# Patient Record
Sex: FEMALE
[De-identification: names, ages, dates, MRNs, and addresses within clinical notes are randomized per-mention and may not be internally consistent; named-entity substitution may affect disease eponyms.]

---

## 2024-03-25 ENCOUNTER — APPOINTMENT (OUTPATIENT)
Dept: UROLOGY | Facility: CLINIC | Age: 49
End: 2024-03-25

## 2024-05-01 ENCOUNTER — APPOINTMENT (OUTPATIENT)
Dept: UROLOGY | Facility: CLINIC | Age: 49
End: 2024-05-01
Payer: COMMERCIAL

## 2024-05-01 VITALS
HEIGHT: 67 IN | OXYGEN SATURATION: 100 % | DIASTOLIC BLOOD PRESSURE: 78 MMHG | BODY MASS INDEX: 24.48 KG/M2 | SYSTOLIC BLOOD PRESSURE: 110 MMHG | WEIGHT: 156 LBS | RESPIRATION RATE: 16 BRPM | HEART RATE: 60 BPM

## 2024-05-01 DIAGNOSIS — Z80.3 FAMILY HISTORY OF MALIGNANT NEOPLASM OF BREAST: ICD-10-CM

## 2024-05-01 DIAGNOSIS — N39.46 MIXED INCONTINENCE: ICD-10-CM

## 2024-05-01 DIAGNOSIS — N39.498 OTHER SPECIFIED URINARY INCONTINENCE: ICD-10-CM

## 2024-05-01 DIAGNOSIS — Z80.1 FAMILY HISTORY OF MALIGNANT NEOPLASM OF TRACHEA, BRONCHUS AND LUNG: ICD-10-CM

## 2024-05-01 PROCEDURE — 51798 US URINE CAPACITY MEASURE: CPT

## 2024-05-01 PROCEDURE — 99204 OFFICE O/P NEW MOD 45 MIN: CPT

## 2024-05-01 PROCEDURE — 51741 ELECTRO-UROFLOWMETRY FIRST: CPT

## 2024-05-01 NOTE — REVIEW OF SYSTEMS
[Recent Weight Loss (___ Lbs)] : recent [unfilled] ~Ulb weight loss [see HPI] : see HPI [Date of last menstrual period ____] : date of last menstrual period: [unfilled] [Leakage of urine with urgency] : leakage of urine with urgency [Leakage of urine with straining, coughing, laughing] : leakage of urine with straining, coughing, laughing [Unaware of when urine is leaking] : unaware of when urine is leaking [Negative] : Heme/Lymph

## 2024-05-05 PROBLEM — N39.498 FREQUENT URINARY INCONTINENCE: Status: ACTIVE | Noted: 2024-05-01

## 2024-05-05 PROBLEM — N35.919 URETHRAL MEATAL STENOSIS: Status: ACTIVE | Noted: 2024-05-05

## 2024-05-05 NOTE — ASSESSMENT
[FreeTextEntry1] : 47 y/o female with mixed JUSTINO. Large bladder capacity, weak pelvic floor and mixed incontinence. Kegel exercises demonstrated and advised daily hold 3 seconds and relax for 5 seconds. 10 daily. kegel exercises. 65 % success in 3 months with compliance  Avoid bladder irritants. voiding diary  Uroflow: prolonged voiding time. 60 S, Large bladder capacity.   PVR  0  ml   Aware that mixed incontinence may require dual modality treatment regimen 1- for OAB and 2-  Pelvic PT/ Surgical intervention.   Expresses understanding Treatment options discussed. Patient share decision making. Treatment for stress incontinence discussed. Conservative with Kegel exercises and or Pelvic floor PT. Bulking agents and or sling insertion. Time spent counseling, care coordination, reviewing chart and  reviewing images.

## 2024-05-05 NOTE — PHYSICAL EXAM
[Normal Appearance] : normal appearance [Well Groomed] : well groomed [General Appearance - In No Acute Distress] : no acute distress [Edema] : no peripheral edema [Respiration, Rhythm And Depth] : normal respiratory rhythm and effort [Exaggerated Use Of Accessory Muscles For Inspiration] : no accessory muscle use [Abdomen Soft] : soft [Abdomen Tenderness] : non-tender [Costovertebral Angle Tenderness] : no ~M costovertebral angle tenderness [Urinary Bladder Findings] : the bladder was normal on palpation [Normal Station and Gait] : the gait and station were normal for the patient's age [] : no rash [No Focal Deficits] : no focal deficits [Oriented To Time, Place, And Person] : oriented to person, place, and time [Affect] : the affect was normal [Mood] : the mood was normal [No Palpable Adenopathy] : no palpable adenopathy [de-identified] : no prolapse. Narrowed meatus. Bladder 495 ml with normal urge to void and small leak with standing and cough. Mild hypermobility. Miladys with cuing 2/5

## 2024-05-05 NOTE — HISTORY OF PRESENT ILLNESS
[Urinary Incontinence] : urinary incontinence [Urinary Urgency] : urinary urgency [Urinary Frequency] : urinary frequency [FreeTextEntry1] : EVAN ALATORREHUNTER  48 year F presents for incontinence. Patient was walking and felt urge and suddenly just wet herself. Very embarrassed. Unable to have a relationship becasue of this.  Patient feels pressure with jumping and afraid that may pee on herself First symptoms 22 years old after birth  x 2 Natural birth Denies hematuria Denies family  malignancies Minimally sexually active. - wishes to date but fears leaking.  LMP 1 week ago.   Wears small panty liners  [Urinary Retention] : no urinary retention

## 2024-06-19 ENCOUNTER — APPOINTMENT (OUTPATIENT)
Dept: UROLOGY | Facility: CLINIC | Age: 49
End: 2024-06-19